# Patient Record
Sex: MALE | Race: ASIAN | NOT HISPANIC OR LATINO | URBAN - METROPOLITAN AREA
[De-identification: names, ages, dates, MRNs, and addresses within clinical notes are randomized per-mention and may not be internally consistent; named-entity substitution may affect disease eponyms.]

---
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
## 2023-02-21 NOTE — ED ADULT NURSE NOTE - NS ED NURSE RECORD ANOTHER VITAL SIGN
Caller: Mallorie Peres    Relationship: Emergency Contact    Best call back number: [phone]    What was the call regarding: PATIENT'S WIFE SCHEDULED A PHYSICAL WITH YURI GUTIERREZ DUE TO NO AVAILABILITY WITH DR. BARDALES UNTIL September 2023      
Yes